# Patient Record
Sex: FEMALE | Race: WHITE | URBAN - METROPOLITAN AREA
[De-identification: names, ages, dates, MRNs, and addresses within clinical notes are randomized per-mention and may not be internally consistent; named-entity substitution may affect disease eponyms.]

---

## 2017-06-21 ENCOUNTER — HISTORICAL (OUTPATIENT)
Dept: SURGERY | Facility: HOSPITAL | Age: 62
End: 2017-06-21

## 2017-06-21 LAB
ABS NEUT (OLG): 2.6 X10(3)/MCL (ref 1.5–6.9)
ALBUMIN SERPL-MCNC: 3.3 GM/DL (ref 3.4–5)
ALBUMIN/GLOB SERPL: 0.9 RATIO
ALP SERPL-CCNC: 74 UNIT/L (ref 30–113)
ALT SERPL-CCNC: 35 UNIT/L (ref 10–45)
APTT PPP: 26.2 SECOND(S) (ref 25–35)
AST SERPL-CCNC: 18 UNIT/L (ref 15–37)
BASOPHILS # BLD AUTO: 0 X10(3)/MCL (ref 0–0.1)
BASOPHILS NFR BLD AUTO: 1 % (ref 0–1)
BILIRUB SERPL-MCNC: 0.5 MG/DL (ref 0.1–0.9)
BILIRUBIN DIRECT+TOT PNL SERPL-MCNC: 0.1 MG/DL (ref 0–0.3)
BILIRUBIN DIRECT+TOT PNL SERPL-MCNC: 0.4 MG/DL
BUN SERPL-MCNC: 12 MG/DL (ref 10–20)
CALCIUM SERPL-MCNC: 9 MG/DL (ref 8–10.5)
CHLORIDE SERPL-SCNC: 107 MMOL/L (ref 100–108)
CO2 SERPL-SCNC: 31 MMOL/L (ref 21–35)
CREAT SERPL-MCNC: 0.64 MG/DL (ref 0.7–1.3)
EOSINOPHIL # BLD AUTO: 0.2 X10(3)/MCL (ref 0–0.6)
EOSINOPHIL NFR BLD AUTO: 4 % (ref 0–5)
ERYTHROCYTE [DISTWIDTH] IN BLOOD BY AUTOMATED COUNT: 12.6 % (ref 11.5–17)
GLOBULIN SER-MCNC: 3.6 GM/DL
GLUCOSE SERPL-MCNC: 82 MG/DL (ref 75–116)
HCT VFR BLD AUTO: 38.2 % (ref 36–48)
HGB BLD-MCNC: 12.8 GM/DL (ref 12–16)
INR PPP: 1 (ref 0–1.2)
LYMPHOCYTES # BLD AUTO: 1.8 X10(3)/MCL (ref 0.5–4.1)
LYMPHOCYTES NFR BLD AUTO: 34.6 % (ref 15–40)
MCH RBC QN AUTO: 29 PG (ref 27–34)
MCHC RBC AUTO-ENTMCNC: 34 GM/DL (ref 31–36)
MCV RBC AUTO: 87 FL (ref 80–99)
MONOCYTES # BLD AUTO: 0.6 X10(3)/MCL (ref 0–1.1)
MONOCYTES NFR BLD AUTO: 11 % (ref 4–12)
NEUTROPHILS # BLD AUTO: 2.6 X10(3)/MCL (ref 1.5–6.9)
NEUTROPHILS NFR BLD AUTO: 50 % (ref 43–75)
PLATELET # BLD AUTO: 273 X10(3)/MCL (ref 140–400)
PMV BLD AUTO: 9.6 FL (ref 6.8–10)
POTASSIUM SERPL-SCNC: 4.4 MMOL/L (ref 3.6–5.2)
PROT SERPL-MCNC: 6.9 GM/DL (ref 6.4–8.2)
PROTHROMBIN TIME: 10.6 SECOND(S) (ref 9–12)
RBC # BLD AUTO: 4.38 X10(6)/MCL (ref 4.2–5.4)
SODIUM SERPL-SCNC: 143 MMOL/L (ref 135–145)
WBC # SPEC AUTO: 5.3 X10(3)/MCL (ref 4.5–11.5)

## 2017-06-26 ENCOUNTER — HISTORICAL (OUTPATIENT)
Dept: ANESTHESIOLOGY | Facility: HOSPITAL | Age: 62
End: 2017-06-26

## 2018-05-29 ENCOUNTER — TELEPHONE (OUTPATIENT)
Dept: TRANSPLANT | Facility: CLINIC | Age: 63
End: 2018-05-29

## 2018-05-29 NOTE — TELEPHONE ENCOUNTER
Deja Santana called and stated that she is interested in becoming a living donor for her , Jamarcus Santana.  Medical and social history obtained.  No contraindications noted.  All questions answered.  After discussion, patient will be a backup at this time and tested if other donors are not approved. Patient agreed and verbalized understanding.

## 2018-12-06 ENCOUNTER — DOCUMENTATION ONLY (OUTPATIENT)
Dept: TRANSPLANT | Facility: CLINIC | Age: 63
End: 2018-12-06

## 2019-05-31 LAB
ABS NEUT (OLG): 2.8 X10(3)/MCL (ref 1.5–6.9)
APPEARANCE, UA: CLEAR
APTT PPP: 26.5 SECOND(S) (ref 25–35)
BILIRUB UR QL STRIP: NEGATIVE
BUN SERPL-MCNC: 17 MG/DL (ref 10–20)
CALCIUM SERPL-MCNC: 9.2 MG/DL (ref 8–10.5)
CHLORIDE SERPL-SCNC: 107 MMOL/L (ref 100–108)
CO2 SERPL-SCNC: 29 MMOL/L (ref 21–35)
COLOR UR: NORMAL
CREAT SERPL-MCNC: 0.76 MG/DL (ref 0.7–1.3)
CREAT/UREA NIT SERPL: 22
CRP SERPL-MCNC: 0.2 MG/DL (ref 0–0.9)
ERYTHROCYTE [DISTWIDTH] IN BLOOD BY AUTOMATED COUNT: 12.5 % (ref 11.5–17)
ERYTHROCYTE [SEDIMENTATION RATE] IN BLOOD: 11 MM/HR (ref 0–20)
GLUCOSE (UA): NEGATIVE
GLUCOSE SERPL-MCNC: 82 MG/DL (ref 75–116)
HCT VFR BLD AUTO: 40 % (ref 36–48)
HGB BLD-MCNC: 12.7 GM/DL (ref 12–16)
HGB UR QL STRIP: NEGATIVE
INR PPP: 1 (ref 0–1.2)
KETONES UR QL STRIP: NEGATIVE
LEUKOCYTE ESTERASE UR QL STRIP: NEGATIVE
MCH RBC QN AUTO: 28 PG (ref 27–34)
MCHC RBC AUTO-ENTMCNC: 32 GM/DL (ref 31–36)
MCV RBC AUTO: 89 FL (ref 80–99)
NITRITE UR QL STRIP: NEGATIVE
PH UR STRIP: 7.5 [PH]
PLATELET # BLD AUTO: 281 X10(3)/MCL (ref 140–400)
PMV BLD AUTO: 9.2 FL (ref 6.8–10)
POTASSIUM SERPL-SCNC: 4 MMOL/L (ref 3.6–5.2)
PROT UR QL STRIP: NEGATIVE
PROTHROMBIN TIME: 9.9 SECOND(S) (ref 9–12)
RBC # BLD AUTO: 4.49 X10(6)/MCL (ref 4.2–5.4)
SODIUM SERPL-SCNC: 142 MMOL/L (ref 135–145)
SP GR UR STRIP: 1.01
UROBILINOGEN UR STRIP-ACNC: 0.2 EU/DL
WBC # SPEC AUTO: 5.3 X10(3)/MCL (ref 4.5–11.5)

## 2019-06-02 LAB — FINAL CULTURE: NORMAL

## 2019-06-04 LAB
CROSSMATCH INTERPRETATION: NORMAL
GROUP & RH: NORMAL
PRODUCT READY: NORMAL

## 2019-06-05 ENCOUNTER — HISTORICAL (OUTPATIENT)
Dept: MEDSURG UNIT | Facility: HOSPITAL | Age: 64
End: 2019-06-05

## 2019-06-05 LAB
HCT VFR BLD AUTO: 38.7 % (ref 36–48)
HGB BLD-MCNC: 12.5 GM/DL (ref 12–16)

## 2019-06-06 LAB
HCT VFR BLD AUTO: 35.6 % (ref 36–48)
HGB BLD-MCNC: 11.9 GM/DL (ref 12–16)

## 2022-04-30 NOTE — DISCHARGE SUMMARY
ADMISSION DIAGNOSIS:  Osteoarthritis, left knee.    DISCHARGE DIAGNOSIS:  Status post total left knee arthroplasty.    HOSPITAL COURSE:  Her hospital stay was without complications.  On discharge, her neurovascular status was intact to her left lower extremity.  Her dressing looked clean and dry.  No complaint of calf pain with firm palpation.  Her Homans sign was negative.  No complaint of chest pain.  No complaint of dyspnea.  No complaint of shortness of breath.  On discharge, her hemoglobin was 11.9, her hematocrit was 35.6.  She had lost only 30 mL of blood today, and so her Hemovac was discontinued.  She is discharged on Ecotrin 325 mg p.o. b.i.d. and BIANCA hose for 6 weeks for anticoagulation prophylaxis.  She is discharged with appointment to see Dr. Turpin in 2 weeks.      Dictated by Wu ___________DEMETRIS/MICHELLE   DD: 06/06/2019 1156   DT: 06/06/2019 1205  Job # [id number]

## 2023-01-13 NOTE — OP NOTE
PREOPERATIVE DIAGNOSIS:  Degenerative joint disease, left knee.    POSTOPERATIVE DIAGNOSIS:  Degenerative joint disease, left knee.    PROCEDURE:  Left total knee arthroplasty.    ANESTHESIA:  __________    IMPLANT TYPE:  Attune noncemented porous-coated left, size 7, porous-coated base plate size 7, and a 5 mm insert.  Non-resurfaced patella.    DESCRIPTION OF PROCEDURE:  The patient was brought to the OR, given IV antibiotics and spinal anesthesia.  Leg was prepped and draped.  Tourniquet was inflated.  Anterior incision was made, medial arthrotomy with a vastus intermedius approach.  I removed osteophytes from the periphery of the patella.  The central portion had good articular surface remaining.  Next, I did distal femoral cut using IM instrumentation, and I did a proximal tibial cut using IM instrumentation.  I checked the extension gap and measured it for 5, equal, medially, and laterally.  I sized the femur for a 7, did tension testing with the knee in flexion, and balanced it medially and laterally, which slightly rotated the finishing block.  I secured that with pins and did the finishing prep on the femur.  Chamfer cuts, flexion cut, anterior cut, and the chip shot.  Tibia was then prepared and sized for a 7, and the flexion extension gaps were now balanced and equal.  I impacted a porous-coated implant on the tibial side, put the poly in, and then the femoral component.  The knee was reduced, taken through a range of motion.  Full extension, full flexion, excellent patellar tracking, and a well-balanced knee.  The knee was Pulsavac lavaged, closed over a drain with interrupted #1 Vicryl, subcu 2-0, subcuticular, and Dermabond glue on the skin.        LUIS E/MICHELLE   DD: 06/08/2019 1242   DT: 06/08/2019 2002  Job # [id number]      Cartilage Graft Text: The defect edges were debeveled with a #15 scalpel blade.  Given the location of the defect, shape of the defect, the fact the defect involved a full thickness cartilage defect a cartilage graft was deemed most appropriate.  An appropriate donor site was identified, cleansed, and anesthetized. The cartilage graft was then harvested and transferred to the recipient site, oriented appropriately and then sutured into place.  The secondary defect was then repaired using a primary closure.